# Patient Record
Sex: FEMALE | Race: BLACK OR AFRICAN AMERICAN | NOT HISPANIC OR LATINO | URBAN - METROPOLITAN AREA
[De-identification: names, ages, dates, MRNs, and addresses within clinical notes are randomized per-mention and may not be internally consistent; named-entity substitution may affect disease eponyms.]

---

## 2021-01-12 ENCOUNTER — HISTORICAL (OUTPATIENT)
Dept: ADMINISTRATIVE | Facility: HOSPITAL | Age: 66
End: 2021-01-12

## 2021-01-13 LAB — FINAL CULTURE: NORMAL

## 2021-04-14 ENCOUNTER — HISTORICAL (OUTPATIENT)
Dept: ADMINISTRATIVE | Facility: HOSPITAL | Age: 66
End: 2021-04-14

## 2021-04-16 LAB — FINAL CULTURE: NORMAL

## 2021-05-07 ENCOUNTER — HISTORICAL (OUTPATIENT)
Dept: ADMINISTRATIVE | Facility: HOSPITAL | Age: 66
End: 2021-05-07

## 2021-05-07 LAB
EST. AVERAGE GLUCOSE BLD GHB EST-MCNC: 200.1 MG/DL
HBA1C MFR BLD: 8.6 %
SARS-COV-2 RNA RESP QL NAA+PROBE: NOT DETECTED

## 2021-05-11 ENCOUNTER — HISTORICAL (OUTPATIENT)
Dept: SURGERY | Facility: HOSPITAL | Age: 66
End: 2021-05-11

## 2021-05-11 LAB
ABS NEUT (OLG): 12.13 X10(3)/MCL (ref 2.1–9.2)
ABS NEUT (OLG): 6.55 X10(3)/MCL (ref 2.1–9.2)
BASOPHILS # BLD AUTO: 0 X10(3)/MCL (ref 0–0.2)
BASOPHILS # BLD AUTO: 0 X10(3)/MCL (ref 0–0.2)
BASOPHILS NFR BLD AUTO: 0 %
BASOPHILS NFR BLD AUTO: 0 %
CROSSMATCH INTERPRETATION: NORMAL
EOSINOPHIL # BLD AUTO: 0.1 X10(3)/MCL (ref 0–0.9)
EOSINOPHIL # BLD AUTO: 0.2 X10(3)/MCL (ref 0–0.9)
EOSINOPHIL NFR BLD AUTO: 1 %
EOSINOPHIL NFR BLD AUTO: 2 %
ERYTHROCYTE [DISTWIDTH] IN BLOOD BY AUTOMATED COUNT: 17.6 % (ref 11.5–14.5)
ERYTHROCYTE [DISTWIDTH] IN BLOOD BY AUTOMATED COUNT: 17.8 % (ref 11.5–14.5)
HCT VFR BLD AUTO: 33.4 % (ref 35–46)
HCT VFR BLD AUTO: 33.6 % (ref 35–46)
HGB BLD-MCNC: 10 GM/DL (ref 12–16)
HGB BLD-MCNC: 10 GM/DL (ref 12–16)
IMM GRANULOCYTES # BLD AUTO: 0.03 10*3/UL
IMM GRANULOCYTES # BLD AUTO: 0.04 10*3/UL
IMM GRANULOCYTES NFR BLD AUTO: 0 %
IMM GRANULOCYTES NFR BLD AUTO: 0 %
LYMPHOCYTES # BLD AUTO: 1.7 X10(3)/MCL (ref 0.6–4.6)
LYMPHOCYTES # BLD AUTO: 2.3 X10(3)/MCL (ref 0.6–4.6)
LYMPHOCYTES NFR BLD AUTO: 12 %
LYMPHOCYTES NFR BLD AUTO: 24 %
MCH RBC QN AUTO: 26.4 PG (ref 26–34)
MCH RBC QN AUTO: 26.8 PG (ref 26–34)
MCHC RBC AUTO-ENTMCNC: 29.8 GM/DL (ref 31–37)
MCHC RBC AUTO-ENTMCNC: 29.9 GM/DL (ref 31–37)
MCV RBC AUTO: 88.1 FL (ref 80–100)
MCV RBC AUTO: 90.1 FL (ref 80–100)
MONOCYTES # BLD AUTO: 0.2 X10(3)/MCL (ref 0.1–1.3)
MONOCYTES # BLD AUTO: 0.5 X10(3)/MCL (ref 0.1–1.3)
MONOCYTES NFR BLD AUTO: 2 %
MONOCYTES NFR BLD AUTO: 5 %
NEUTROPHILS # BLD AUTO: 12.13 X10(3)/MCL (ref 2.1–9.2)
NEUTROPHILS # BLD AUTO: 6.55 X10(3)/MCL (ref 2.1–9.2)
NEUTROPHILS NFR BLD AUTO: 68 %
NEUTROPHILS NFR BLD AUTO: 86 %
PLATELET # BLD AUTO: 184 X10(3)/MCL (ref 130–400)
PLATELET # BLD AUTO: 226 X10(3)/MCL (ref 130–400)
PMV BLD AUTO: 12.7 FL (ref 7.4–10.4)
PMV BLD AUTO: 13.1 FL (ref 7.4–10.4)
PRODUCT READY: NORMAL
RBC # BLD AUTO: 3.73 X10(6)/MCL (ref 4–5.2)
RBC # BLD AUTO: 3.79 X10(6)/MCL (ref 4–5.2)
TRANSFUSION ORDER: NORMAL
WBC # SPEC AUTO: 14.2 X10(3)/MCL (ref 4.5–11)
WBC # SPEC AUTO: 9.6 X10(3)/MCL (ref 4.5–11)

## 2021-08-02 ENCOUNTER — HISTORICAL (OUTPATIENT)
Dept: ADMINISTRATIVE | Facility: HOSPITAL | Age: 66
End: 2021-08-02

## 2021-08-04 LAB — FINAL CULTURE: NORMAL

## 2021-08-13 LAB — BCS RECOMMENDATION EXT: NORMAL

## 2021-09-28 ENCOUNTER — HISTORICAL (OUTPATIENT)
Dept: ADMINISTRATIVE | Facility: HOSPITAL | Age: 66
End: 2021-09-28

## 2021-10-01 LAB — FINAL CULTURE: NORMAL

## 2022-04-10 ENCOUNTER — HISTORICAL (OUTPATIENT)
Dept: ADMINISTRATIVE | Facility: HOSPITAL | Age: 67
End: 2022-04-10

## 2022-04-24 VITALS
WEIGHT: 293 LBS | HEIGHT: 66 IN | SYSTOLIC BLOOD PRESSURE: 142 MMHG | BODY MASS INDEX: 47.09 KG/M2 | OXYGEN SATURATION: 99 % | DIASTOLIC BLOOD PRESSURE: 84 MMHG

## 2022-05-04 NOTE — HISTORICAL [facility] CERNER
This is a historical note converted from Faye. Formatting and pictures may have been removed.  Please reference Faye for original formatting and attached multimedia. H&P Interval?Update  ?   Ms. Seals presents for scheduled hysteroscopy D&C with mirena IUD insertion for post menopausal bleeding  ?   She is able to describe planned procedure in her own words.  No new medical problems or medications.  ?   After procedure we have her permission to contact?her daughter Lindsay?at [phone]  ?  ?Event Name? Event Result? Date/Time?   Temperature Oral 37 DegC 21 05:13:41   Peripheral Pulse Rate 59 bpm?Low 21 05:13:41   SpO2 100 % 21 05:13:41   Systolic Blood Pressure 201 mmHg?High 21 05:13:41   Diastolic Blood Pressure 74 mmHg 21 05:13:41   Mean Arterial Pressure, Cuff 117 mmHg 21 05:13:41   repeat BP: 152/75  ?   GEN: NAD, A&O  Card: RRR  Pulm: CTAB  Abdomen: Soft, non-tender, no rebound or guarding  Ext: Calves?non-tender bilaterally  ?  AM meds this am: carvedilol, flonase, allopurinol, protonix  ?  Td Rendon MD?  [employer] [facility], PGY2  ?  History of Present Illness  64yo  presenting for surgical management for postmenopausal bleeding. She had bleeding in 2017, and 2019. She was admitted twice since December for blood transfusions at [facility], most recently 21 and started on 10mg daily Provera. No further bleeding since starting daily Provera. She went through menopause at age 55 and has had intermittent irregular vaginal bleeding since 2018. She notes heavy bleeding with passage of blood clots, lasting several days to a few weeks.  ?  EMB 2021: scant superficial fragments of benign endometrium  ?  Pap smear 2021: NIL, HPV negative  ?  -- History of?heart failure with preserved ejection fraction: last echo was 2020, LVEF 55-60%. Cardiologist is Dr. Cantor in [facility]. S/p Cards clearance low acceptable risk, hold ASA 5-7 days prior to procedure.  Records scanned into chart. Taking Carvedilol.  -- CHTN: taking lisinopril, furosemide  -- T2DM: Basaglar 72 units nightly, Novolog 5u daily, Januvia  -- CKD: most recent Cr 1.54 (4/2021)  ?  GYNhx:  Menopause @late 50s  Denies abnormal paps/STIs  Not currently sexually active  ?  Patient does not work and lives with her daughter who will care for her after surgery.  Review of Systems  The patient denies the following:?? (positive ROS is highlighted)  Constitutional:? weight loss, weight gain, fever/chills, night sweats, excessive fatigue  Endocrine: heat or cold intolerance, excessive thirst, abnormal hair growth?  Eyes, Ears, Nose & Throat: visual problems, hearing problems, nose bleeds, sinus problems, sore throat  Gastrointestinal: frequent heartburn, abdominal pain, blood in stools, diarrhea, constipation  Hematologic: easy bruising, bleeding gums, prolonged bleeding, clotting problems  Cardiovascular: chest pain, palpitations, trouble breathing when lying flat  Respiratory:?shortness of breath, chronic cough, wheezing  Skin: itching,?rash, new moles or lesions  Psychosocial:? difficulty sleeping, anxiety or panic attacks,? depression  Gynecologic: see HPI  Urinary:?stress incontinence, urge incontinence, hematuria, frequency, urgency, dysuria, difficulty urinating,?bulge in vagina  Neurologic: headaches, dizziness, memory loss.  Musculoskeletal:?joint?stiffness,?joint?pain/swelling,?back pain.?  Physical Exam  ??Vitals & Measurements  ??T:?36.8? ?C (Oral)? HR:?68(Peripheral)? RR:?18? BP:?142/84? SpO2:?99%?  ??HT:?168?cm? HT:?168.00?cm? WT:?158.3?kg? WT:?158.300?kg? BMI:?56.09?  General: NAD, A/Ox3, walks with a cane  Respiratory:? CTA Bilaterally, no wheezes/rales/rhonchi, good inspiratory effort  Cardiac: RRR, no MRGs  Abdomen: soft, obese,?BS active, nondistended, nontender to palpation,?no masses palpated  Incisions: 12cm incision across RUQ from previous cholecystectomy  Extremities: no edema, no calf  tenderness bilaterally,?symmetric  ?  Assessment/Plan  1.?Abnormal uterine bleeding?N93.9  ?  2.?Diabetes mellitus?E11.9  ?  3.?Hypertension?I10  ?  4.?Morbid obesity?E66.01  ?  5.?Chronic renal impairment?N18.9  ?  6.?Pre-operative examination?Z01.818  ???She was counseled that the benefits of doing a hysteroscopy are to possibly diagnosis and treat her medical problem. The risks of a hysteroscopy include, but are not limited to cramping, injury or damage to the bowel, bladder, ovaries, uterus, fallopian tubes, nerves, as well as blood vessels.??If these organs are damage, they may require repair.??If such an injury were to occur, a laparoscope or an abdominal incision (exploratory laparotomy) may be required in order to repair the damage.??Additional risks specific to hysteroscopy include?fluid imbalance and perforation of the uterus. Alternatives to this planned procedure were explained to the patient including expectant, medical, and other types of surgical management. Discussed Mirena IUD for management of PMB, given her significant comorbidities she would be high risk to undergo a hysterectomy. Discussed that a Mirena IUD is not typical management of PMB but once we have ruled out malignancy it can be an option for her given her medical history.  ?  Risks that are specific to this patient were also discussed including acquisition of COVID 19.  ?  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 She has consented to a pelvic?exam under anesthesia and? understands that medical students participate in this portion of the procedure. Surgical consents were signed and witnessed.  ?  We reviewed the typical perioperative course, anticipation of same day discharge home. Instructions reviewed, including NPO after midnight prior to surgery. Post-operative precautions were reviewed. ??Discussed?outpatient surgery expectations and  recovery time.  ?  ??[facility] appointment requested.  Consents reviewed with patient and signed.  Labs today: None  Labs DOS: T&M 2u pRBCs (hold)  Meds DOS: Carvedilol, Pantprazole  Caprini score?6; SCDs for DVT prophylaxis and??Heparin  Abx: None  Post-op appt 5/26  ?  ??To OR for?hysteroscopy D&C and Mirena IUD insertion.  ?  **ADDED POLYPECTOMY TO consent this am since I suspect she has a polyp.? She has no further questions and I have seen her this am. -MST

## 2022-05-04 NOTE — HISTORICAL [facility] CERNER
This is a historical note converted from Cerner. Formatting and pictures may have been removed.  Please reference Cerner for original formatting and attached multimedia. Indication for Surgery  Postmenopausal bleeding  Preoperative Diagnosis  Postmenopausal bleeding  Postoperative Diagnosis  Postmenopausal bleeding  endometrial polyp  heterogenous endometrium  Operation  Hysteroscopic polypectomy and?endometrial curettage  Surgeon(s)  Dr. Bhatia, Staff  Dr. Rendon, PGY2  Dr. Caal, PGY3  Anesthesia  General  Estimated Blood Loss  15ml  ?  IVF: 700 ml  ?  Fluid Deficit: 745ml  Urine Output  30ml  Findings  EUA: NEFG without lesion, vaginal mucosa and cervix without lesion, approximately 10 week sized retroflexed uterus, mobile.?excellent descent and adequate vaginal capacity.?No adnexal fullness bilaterally.  Hysteroscopy revealed heterogenous endometrium with areas of calcifications and hypervascularity.Approximately 2.5 cm endometrial polyp seen?at left posterior uterine isthmus, as well as other smaller polyps. bilateral tubal ostia not patent.  Specimen(s)  Endometrial polyp  endometrial curettings  Complications  none  Technique  The patient was taken to the operating room with IVF running. SCDs were placed on bilateral lower extremities for DVT prophylaxis.?Patient also got heparin in Pre-op for DVT prophylaxis.?General anesthesia was obtained without difficulty.? She was placed in the dorsal lithotomy position with legs in Jett type stirrups.? Exam under anesthesia revealed the findings as above. She was prepped and draped in the normal sterile fashion. A straight catheter was placed to drain the bladder. The cervix was visualized and the anterior lip of the cervix was grasped with a single-toothed tenaculum. A?0 degree?hysteroscope was introduced into the cervix with hydrodistension. Hysteroscopy revealed the findings as stated above.?The Myosure light device was then used to remove endometrial  polyp.?Circumferential?sampling of the endometrium was then obtained with the?Myosure light?device. The hysteroscope was then removed. The uterus was curetted in a clockwise fashion, and the endometrial scrapings were sent to pathology. A?Mirena IUD (Lot# [id number])?was then inserted using the applicator without difficulty. The strings were then cut to the length of 3cm and seen outside the external cervical os.?The hysteroscope was then re-inserted into the uterine cavity to confirm correct placement of IUD at the uterine fundus. The hysteroscope was then removed. The tenaculum was removed from the cervix and good hemostasis was noted at puncture sites.?  ?   The patient tolerated the procedure well. The instrument and sponge counts were correct times two. The patient awaked from anesthesia and was taken to recovery in stable condition.  ?   ?Sriram was present for the entire procedure.?  ?   Td Rendon MD?  [employer] [facility], PGY2  ?  This certifies I was present during the entire period between opening and closing of the surgical field.

## 2022-12-17 ENCOUNTER — DOCUMENTATION ONLY (OUTPATIENT)
Dept: INTERNAL MEDICINE | Facility: CLINIC | Age: 67
End: 2022-12-17